# Patient Record
Sex: FEMALE | Race: NATIVE HAWAIIAN OR OTHER PACIFIC ISLANDER | NOT HISPANIC OR LATINO | URBAN - METROPOLITAN AREA
[De-identification: names, ages, dates, MRNs, and addresses within clinical notes are randomized per-mention and may not be internally consistent; named-entity substitution may affect disease eponyms.]

---

## 2022-02-03 ENCOUNTER — WEB ENCOUNTER (OUTPATIENT)
Dept: URBAN - METROPOLITAN AREA CLINIC 105 | Facility: CLINIC | Age: 67
End: 2022-02-03

## 2022-02-03 ENCOUNTER — OFFICE VISIT (OUTPATIENT)
Dept: URBAN - METROPOLITAN AREA CLINIC 105 | Facility: CLINIC | Age: 67
End: 2022-02-03
Payer: COMMERCIAL

## 2022-02-03 VITALS
HEART RATE: 92 BPM | HEIGHT: 64 IN | DIASTOLIC BLOOD PRESSURE: 77 MMHG | BODY MASS INDEX: 17.58 KG/M2 | WEIGHT: 103 LBS | SYSTOLIC BLOOD PRESSURE: 160 MMHG | TEMPERATURE: 97.3 F

## 2022-02-03 DIAGNOSIS — R10.10 PAIN OF UPPER ABDOMEN: ICD-10-CM

## 2022-02-03 DIAGNOSIS — R63.4 UNINTENDED WEIGHT LOSS: ICD-10-CM

## 2022-02-03 DIAGNOSIS — J44.9 CHRONIC OBSTRUCTIVE PULMONARY DISEASE, UNSPECIFIED COPD TYPE: ICD-10-CM

## 2022-02-03 DIAGNOSIS — I10 HTN (HYPERTENSION), BENIGN: ICD-10-CM

## 2022-02-03 DIAGNOSIS — K59.03 DRUG INDUCED CONSTIPATION: ICD-10-CM

## 2022-02-03 DIAGNOSIS — F11.90 CHRONIC NARCOTIC USE: ICD-10-CM

## 2022-02-03 DIAGNOSIS — G89.4 CHRONIC PAIN SYNDROME: ICD-10-CM

## 2022-02-03 DIAGNOSIS — Z72.0 TOBACCO ABUSE: ICD-10-CM

## 2022-02-03 DIAGNOSIS — R63.0 LOSS OF APPETITE: ICD-10-CM

## 2022-02-03 DIAGNOSIS — K59.09 CHRONIC CONSTIPATION: ICD-10-CM

## 2022-02-03 PROBLEM — 79890006: Status: ACTIVE | Noted: 2022-02-03

## 2022-02-03 PROCEDURE — 99205 OFFICE O/P NEW HI 60 MIN: CPT | Performed by: INTERNAL MEDICINE

## 2022-02-03 RX ORDER — POTASSIUM CHLORIDE 750 MG/1
TABLET, EXTENDED RELEASE ORAL
Qty: 45 | Status: ACTIVE | COMMUNITY

## 2022-02-03 RX ORDER — BUTALBITAL, ASPIRIN, AND CAFFEINE 50; 325; 40 MG/1; MG/1; MG/1
TAKE 1 CAPSULE BY MOUTH EVERY 6 HOURS AS NEEDED FOR HEADACHES CAPSULE ORAL
Qty: 30 | Refills: 0 | Status: ACTIVE | COMMUNITY

## 2022-02-03 RX ORDER — LISINOPRIL 40 MG/1
TAKE 1 TABLET BY MOUTH EVERY DAY TABLET ORAL
Qty: 90 | Refills: 0 | Status: ACTIVE | COMMUNITY

## 2022-02-03 RX ORDER — ALBUTEROL SULFATE 90 UG/1
INHALE 2 PUFFS INTO THE LUNGS EVERY 4 (FOUR) HOURS AS NEEDED FOR WHEEZING OR SHORTNESS OF BREATH AEROSOL, METERED RESPIRATORY (INHALATION)
Qty: 54 | Refills: 0 | Status: ACTIVE | COMMUNITY

## 2022-02-03 RX ORDER — UMECLIDINIUM BROMIDE AND VILANTEROL TRIFENATATE 62.5; 25 UG/1; UG/1
POWDER RESPIRATORY (INHALATION)
Qty: 60 | Status: ACTIVE | COMMUNITY

## 2022-02-03 RX ORDER — SODIUM, POTASSIUM,MAG SULFATES 17.5-3.13G
177 ML SOLUTION, RECONSTITUTED, ORAL ORAL
Qty: 1 KIT | Refills: 0 | OUTPATIENT
Start: 2022-02-03

## 2022-02-03 RX ORDER — BISACODYL 5 MG
TAKE 4 TABLET, DELAYED RELEASE (ENTERIC COATED) ORAL
Qty: 4 | OUTPATIENT
Start: 2022-02-03 | End: 2022-02-04

## 2022-02-03 RX ORDER — TRAZODONE HYDROCHLORIDE 50 MG/1
TAKE 1 TO 2 TABLETS BY MOUTH AT BEDTIME AS NEEDED TABLET ORAL
Qty: 180 | Refills: 0 | Status: ACTIVE | COMMUNITY

## 2022-02-03 RX ORDER — TIZANIDINE 4 MG/1
TAKE 1 TABLET BY MOUTH 1-2 TIMES DAILY AS NEEDED TABLET ORAL
Qty: 45 | Refills: 0 | Status: ACTIVE | COMMUNITY

## 2022-02-03 RX ORDER — NALOXEGOL OXALATE 25 MG/1
1 TABLET IN THE MORNING TABLET, FILM COATED ORAL ONCE A DAY
Qty: 90 | Refills: 3 | OUTPATIENT
Start: 2022-02-03 | End: 2022-06-03

## 2022-02-03 RX ORDER — OXYCODONE AND ACETAMINOPHEN 10; 325 MG/1; MG/1
TABLET ORAL
Qty: 120 | Status: ACTIVE | COMMUNITY

## 2022-02-03 RX ORDER — TRIAMCINOLONE ACETONIDE 1 MG/G
APPLY TO AFFECTED AREA TWICE A DAY CREAM TOPICAL
Qty: 30 | Refills: 0 | Status: ACTIVE | COMMUNITY

## 2022-02-03 RX ORDER — VENLAFAXINE HYDROCHLORIDE 150 MG/1
CAPSULE, EXTENDED RELEASE ORAL
Qty: 180 | Status: ACTIVE | COMMUNITY

## 2022-02-03 RX ORDER — IBUPROFEN 800 MG/1
TABLET, FILM COATED ORAL
Qty: 42 | Refills: 0 | Status: ACTIVE | COMMUNITY

## 2022-02-03 RX ORDER — GABAPENTIN 600 MG/1
TAKE 1 TABLET BY MOUTH THREE TIMES A DAY FOR 30 DAYS TABLET, FILM COATED ORAL
Qty: 90 | Refills: 0 | Status: ACTIVE | COMMUNITY

## 2022-02-03 NOTE — HPI-TODAY'S VISIT:
Patient comes to arrange a colonoscopy.  In April of 2012 she underwent colonoscopy with Dr. Acevedo. there was 1 small tubular adenoma removed from the sigmoid colon. - she reports having intermittent bouts of constipation down through the years. here late lately it has gotten worse. she can skip up to 6-7 days with no stool. no blood in the stool. has tried OTC stool softener, exlax, and MagCitrate. - she is taking 10mg of percocet QID. -   She also reports some loss of appetite and she endorses a 10 lb weight loss over the last 4-5 months.

## 2022-02-05 ENCOUNTER — OFFICE VISIT (OUTPATIENT)
Dept: URBAN - METROPOLITAN AREA MEDICAL CENTER 33 | Facility: MEDICAL CENTER | Age: 67
End: 2022-02-05
Payer: COMMERCIAL

## 2022-02-05 DIAGNOSIS — R63.4 ABNORMAL INTENTIONAL WEIGHT LOSS: ICD-10-CM

## 2022-02-05 DIAGNOSIS — K31.89 ACQUIRED DEFORMITY OF DUODENUM: ICD-10-CM

## 2022-02-05 DIAGNOSIS — D12.2 ADENOMA OF ASCENDING COLON: ICD-10-CM

## 2022-02-05 DIAGNOSIS — R19.4 ALTERATION IN BOWEL ELIMINATION: ICD-10-CM

## 2022-02-05 DIAGNOSIS — D12.4 ADENOMA OF DESCENDING COLON: ICD-10-CM

## 2022-02-05 DIAGNOSIS — R10.13 ABDOMINAL DISCOMFORT, EPIGASTRIC: ICD-10-CM

## 2022-02-05 DIAGNOSIS — K22.89 ESOPHAGEAL BLEEDING: ICD-10-CM

## 2022-02-05 PROCEDURE — 45385 COLONOSCOPY W/LESION REMOVAL: CPT | Performed by: INTERNAL MEDICINE

## 2022-02-05 PROCEDURE — 44361 SMALL BOWEL ENDOSCOPY/BIOPSY: CPT | Performed by: INTERNAL MEDICINE

## 2022-02-05 RX ORDER — GABAPENTIN 600 MG/1
TAKE 1 TABLET BY MOUTH THREE TIMES A DAY FOR 30 DAYS TABLET, FILM COATED ORAL
Qty: 90 | Refills: 0 | Status: ACTIVE | COMMUNITY

## 2022-02-05 RX ORDER — SODIUM, POTASSIUM,MAG SULFATES 17.5-3.13G
177 ML SOLUTION, RECONSTITUTED, ORAL ORAL
Qty: 1 KIT | Refills: 0 | Status: ACTIVE | COMMUNITY
Start: 2022-02-03

## 2022-02-05 RX ORDER — POTASSIUM CHLORIDE 750 MG/1
TABLET, EXTENDED RELEASE ORAL
Qty: 45 | Status: ACTIVE | COMMUNITY

## 2022-02-05 RX ORDER — LISINOPRIL 40 MG/1
TAKE 1 TABLET BY MOUTH EVERY DAY TABLET ORAL
Qty: 90 | Refills: 0 | Status: ACTIVE | COMMUNITY

## 2022-02-05 RX ORDER — ALBUTEROL SULFATE 90 UG/1
INHALE 2 PUFFS INTO THE LUNGS EVERY 4 (FOUR) HOURS AS NEEDED FOR WHEEZING OR SHORTNESS OF BREATH AEROSOL, METERED RESPIRATORY (INHALATION)
Qty: 54 | Refills: 0 | Status: ACTIVE | COMMUNITY

## 2022-02-05 RX ORDER — VENLAFAXINE HYDROCHLORIDE 150 MG/1
CAPSULE, EXTENDED RELEASE ORAL
Qty: 180 | Status: ACTIVE | COMMUNITY

## 2022-02-05 RX ORDER — OXYCODONE AND ACETAMINOPHEN 10; 325 MG/1; MG/1
TABLET ORAL
Qty: 120 | Status: ACTIVE | COMMUNITY

## 2022-02-05 RX ORDER — TIZANIDINE 4 MG/1
TAKE 1 TABLET BY MOUTH 1-2 TIMES DAILY AS NEEDED TABLET ORAL
Qty: 45 | Refills: 0 | Status: ACTIVE | COMMUNITY

## 2022-02-05 RX ORDER — TRAZODONE HYDROCHLORIDE 50 MG/1
TAKE 1 TO 2 TABLETS BY MOUTH AT BEDTIME AS NEEDED TABLET ORAL
Qty: 180 | Refills: 0 | Status: ACTIVE | COMMUNITY

## 2022-02-05 RX ORDER — NALOXEGOL OXALATE 25 MG/1
1 TABLET IN THE MORNING TABLET, FILM COATED ORAL ONCE A DAY
Qty: 90 | Refills: 3 | Status: ACTIVE | COMMUNITY
Start: 2022-02-03 | End: 2022-06-03

## 2022-02-05 RX ORDER — UMECLIDINIUM BROMIDE AND VILANTEROL TRIFENATATE 62.5; 25 UG/1; UG/1
POWDER RESPIRATORY (INHALATION)
Qty: 60 | Status: ACTIVE | COMMUNITY

## 2022-02-05 RX ORDER — FAMOTIDINE 40 MG/1
1 TABLET TABLET, FILM COATED ORAL ONCE A DAY
Qty: 90 | Refills: 3 | OUTPATIENT
Start: 2022-02-05

## 2022-02-05 RX ORDER — IBUPROFEN 800 MG/1
TABLET, FILM COATED ORAL
Qty: 42 | Refills: 0 | Status: ACTIVE | COMMUNITY

## 2022-02-05 RX ORDER — BUTALBITAL, ASPIRIN, AND CAFFEINE 50; 325; 40 MG/1; MG/1; MG/1
TAKE 1 CAPSULE BY MOUTH EVERY 6 HOURS AS NEEDED FOR HEADACHES CAPSULE ORAL
Qty: 30 | Refills: 0 | Status: ACTIVE | COMMUNITY

## 2022-02-05 RX ORDER — TRIAMCINOLONE ACETONIDE 1 MG/G
APPLY TO AFFECTED AREA TWICE A DAY CREAM TOPICAL
Qty: 30 | Refills: 0 | Status: ACTIVE | COMMUNITY

## 2022-02-08 ENCOUNTER — TELEPHONE ENCOUNTER (OUTPATIENT)
Dept: URBAN - METROPOLITAN AREA CLINIC 105 | Facility: CLINIC | Age: 67
End: 2022-02-08

## 2022-03-07 ENCOUNTER — OFFICE VISIT (OUTPATIENT)
Dept: URBAN - METROPOLITAN AREA CLINIC 105 | Facility: CLINIC | Age: 67
End: 2022-03-07
Payer: COMMERCIAL

## 2022-03-07 ENCOUNTER — OFFICE VISIT (OUTPATIENT)
Dept: URBAN - METROPOLITAN AREA CLINIC 105 | Facility: CLINIC | Age: 67
End: 2022-03-07

## 2022-03-07 VITALS
DIASTOLIC BLOOD PRESSURE: 67 MMHG | HEART RATE: 97 BPM | HEIGHT: 64 IN | SYSTOLIC BLOOD PRESSURE: 124 MMHG | TEMPERATURE: 98.1 F | BODY MASS INDEX: 17.28 KG/M2 | WEIGHT: 101.2 LBS

## 2022-03-07 DIAGNOSIS — K59.09 CHRONIC CONSTIPATION: ICD-10-CM

## 2022-03-07 DIAGNOSIS — Z99.81 ON HOME OXYGEN THERAPY: ICD-10-CM

## 2022-03-07 DIAGNOSIS — J44.9 CHRONIC OBSTRUCTIVE PULMONARY DISEASE, UNSPECIFIED COPD TYPE: ICD-10-CM

## 2022-03-07 DIAGNOSIS — F11.90 CHRONIC NARCOTIC USE: ICD-10-CM

## 2022-03-07 DIAGNOSIS — Z86.010 HISTORY OF COLON POLYPS: ICD-10-CM

## 2022-03-07 DIAGNOSIS — Z72.0 TOBACCO ABUSE: ICD-10-CM

## 2022-03-07 PROCEDURE — 99214 OFFICE O/P EST MOD 30 MIN: CPT | Performed by: INTERNAL MEDICINE

## 2022-03-07 RX ORDER — SODIUM, POTASSIUM,MAG SULFATES 17.5-3.13G
177 ML SOLUTION, RECONSTITUTED, ORAL ORAL
Qty: 1 KIT | Refills: 0 | Status: ON HOLD | COMMUNITY
Start: 2022-02-03

## 2022-03-07 RX ORDER — IBUPROFEN 800 MG/1
TABLET, FILM COATED ORAL
Qty: 42 | Refills: 0 | Status: ACTIVE | COMMUNITY

## 2022-03-07 RX ORDER — SODIUM, POTASSIUM,MAG SULFATES 17.5-3.13G
177 ML SOLUTION, RECONSTITUTED, ORAL ORAL
Qty: 1 KIT | Refills: 0 | COMMUNITY
Start: 2022-02-03

## 2022-03-07 RX ORDER — TIZANIDINE 4 MG/1
TAKE 1 TABLET BY MOUTH 1-2 TIMES DAILY AS NEEDED TABLET ORAL
Qty: 45 | Refills: 0 | Status: ACTIVE | COMMUNITY

## 2022-03-07 RX ORDER — TRIAMCINOLONE ACETONIDE 1 MG/G
APPLY TO AFFECTED AREA TWICE A DAY CREAM TOPICAL
Qty: 30 | Refills: 0 | COMMUNITY

## 2022-03-07 RX ORDER — NALOXEGOL OXALATE 25 MG/1
1 TABLET IN THE MORNING TABLET, FILM COATED ORAL ONCE A DAY
Qty: 90 | Refills: 3 | Status: ACTIVE | COMMUNITY
Start: 2022-02-03 | End: 2022-06-03

## 2022-03-07 RX ORDER — IBUPROFEN 800 MG/1
TABLET, FILM COATED ORAL
Qty: 42 | Refills: 0 | COMMUNITY

## 2022-03-07 RX ORDER — POTASSIUM CHLORIDE 750 MG/1
TABLET, EXTENDED RELEASE ORAL
Qty: 45 | Status: ACTIVE | COMMUNITY

## 2022-03-07 RX ORDER — ALBUTEROL SULFATE 90 UG/1
INHALE 2 PUFFS INTO THE LUNGS EVERY 4 (FOUR) HOURS AS NEEDED FOR WHEEZING OR SHORTNESS OF BREATH AEROSOL, METERED RESPIRATORY (INHALATION)
Qty: 54 | Refills: 0 | Status: ACTIVE | COMMUNITY

## 2022-03-07 RX ORDER — NALOXEGOL OXALATE 25 MG/1
1 TABLET IN THE MORNING TABLET, FILM COATED ORAL ONCE A DAY
Qty: 90 | Refills: 3 | COMMUNITY
Start: 2022-02-03 | End: 2022-06-03

## 2022-03-07 RX ORDER — TIZANIDINE 4 MG/1
TAKE 1 TABLET BY MOUTH 1-2 TIMES DAILY AS NEEDED TABLET ORAL
Qty: 45 | Refills: 0 | COMMUNITY

## 2022-03-07 RX ORDER — VENLAFAXINE HYDROCHLORIDE 150 MG/1
CAPSULE, EXTENDED RELEASE ORAL
Qty: 180 | COMMUNITY

## 2022-03-07 RX ORDER — VENLAFAXINE HYDROCHLORIDE 150 MG/1
CAPSULE, EXTENDED RELEASE ORAL
Qty: 180 | Status: ACTIVE | COMMUNITY

## 2022-03-07 RX ORDER — UMECLIDINIUM BROMIDE AND VILANTEROL TRIFENATATE 62.5; 25 UG/1; UG/1
POWDER RESPIRATORY (INHALATION)
Qty: 60 | COMMUNITY

## 2022-03-07 RX ORDER — TRIAMCINOLONE ACETONIDE 1 MG/G
APPLY TO AFFECTED AREA TWICE A DAY CREAM TOPICAL
Qty: 30 | Refills: 0 | Status: ACTIVE | COMMUNITY

## 2022-03-07 RX ORDER — TRAZODONE HYDROCHLORIDE 50 MG/1
TAKE 1 TO 2 TABLETS BY MOUTH AT BEDTIME AS NEEDED TABLET ORAL
Qty: 180 | Refills: 0 | COMMUNITY

## 2022-03-07 RX ORDER — BUTALBITAL, ASPIRIN, AND CAFFEINE 50; 325; 40 MG/1; MG/1; MG/1
TAKE 1 CAPSULE BY MOUTH EVERY 6 HOURS AS NEEDED FOR HEADACHES CAPSULE ORAL
Qty: 30 | Refills: 0 | Status: ACTIVE | COMMUNITY

## 2022-03-07 RX ORDER — GABAPENTIN 600 MG/1
TAKE 1 TABLET BY MOUTH THREE TIMES A DAY FOR 30 DAYS TABLET, FILM COATED ORAL
Qty: 90 | Refills: 0 | Status: ACTIVE | COMMUNITY

## 2022-03-07 RX ORDER — LISINOPRIL 40 MG/1
TAKE 1 TABLET BY MOUTH EVERY DAY TABLET ORAL
Qty: 90 | Refills: 0 | Status: ACTIVE | COMMUNITY

## 2022-03-07 RX ORDER — ALBUTEROL SULFATE 90 UG/1
INHALE 2 PUFFS INTO THE LUNGS EVERY 4 (FOUR) HOURS AS NEEDED FOR WHEEZING OR SHORTNESS OF BREATH AEROSOL, METERED RESPIRATORY (INHALATION)
Qty: 54 | Refills: 0 | COMMUNITY

## 2022-03-07 RX ORDER — UMECLIDINIUM BROMIDE AND VILANTEROL TRIFENATATE 62.5; 25 UG/1; UG/1
POWDER RESPIRATORY (INHALATION)
Qty: 60 | Status: ACTIVE | COMMUNITY

## 2022-03-07 RX ORDER — LISINOPRIL 40 MG/1
TAKE 1 TABLET BY MOUTH EVERY DAY TABLET ORAL
Qty: 90 | Refills: 0 | COMMUNITY

## 2022-03-07 RX ORDER — GABAPENTIN 600 MG/1
TAKE 1 TABLET BY MOUTH THREE TIMES A DAY FOR 30 DAYS TABLET, FILM COATED ORAL
Qty: 90 | Refills: 0 | COMMUNITY

## 2022-03-07 RX ORDER — FAMOTIDINE 40 MG/1
1 TABLET TABLET, FILM COATED ORAL ONCE A DAY
Qty: 90 | Refills: 3 | Status: ACTIVE | COMMUNITY
Start: 2022-02-05

## 2022-03-07 RX ORDER — TRAZODONE HYDROCHLORIDE 50 MG/1
TAKE 1 TO 2 TABLETS BY MOUTH AT BEDTIME AS NEEDED TABLET ORAL
Qty: 180 | Refills: 0 | Status: ACTIVE | COMMUNITY

## 2022-03-07 RX ORDER — BUTALBITAL, ASPIRIN, AND CAFFEINE 50; 325; 40 MG/1; MG/1; MG/1
TAKE 1 CAPSULE BY MOUTH EVERY 6 HOURS AS NEEDED FOR HEADACHES CAPSULE ORAL
Qty: 30 | Refills: 0 | COMMUNITY

## 2022-03-07 RX ORDER — OXYCODONE AND ACETAMINOPHEN 10; 325 MG/1; MG/1
TABLET ORAL
Qty: 120 | COMMUNITY

## 2022-03-07 RX ORDER — OXYCODONE AND ACETAMINOPHEN 10; 325 MG/1; MG/1
TABLET ORAL
Qty: 120 | Status: ACTIVE | COMMUNITY

## 2022-03-07 RX ORDER — POTASSIUM CHLORIDE 750 MG/1
TABLET, EXTENDED RELEASE ORAL
Qty: 45 | COMMUNITY

## 2022-03-07 RX ORDER — FAMOTIDINE 40 MG/1
1 TABLET TABLET, FILM COATED ORAL ONCE A DAY
Qty: 90 | Refills: 3 | COMMUNITY
Start: 2022-02-05

## 2022-03-07 NOTE — HPI-TODAY'S VISIT:
Patient comes to arrange a colonoscopy.  In April of 2012 she underwent colonoscopy with Dr. Acevedo. there was 1 small tubular adenoma removed from the sigmoid colon. - she reports having intermittent bouts of constipation down through the years. here late lately it has gotten worse. she can skip up to 6-7 days with no stool. no blood in the stool. has tried OTC stool softener, exlax, and MagCitrate. - she is taking 10mg of percocet QID. -   She also reports some loss of appetite and she endorses a 10 lb weight loss over the last 4-5 months. -  03/07/2022:  February of 2022 we performed bidirectional endoscopy.  Her small bowel enteroscopy was significant for loss Lolly grade a reflux esophagitis from which biopsies were negative for Rivera's metaplasia.  There was erythema in the antrum.  No Helicobacter on biopsies.  Colonoscopy revealed several adenomatous colon polyps.  She returns to the office today here at [facility] for follow-up. - she is having about one stool per day. I started her on Movantik daily. it has helped over all. she is not skipping 3-4 days now without a stool.

## 2022-03-07 NOTE — HPI-TODAY'S VISIT:
Patient comes to arrange a colonoscopy.  In April of 2012 she underwent colonoscopy with Dr. Acevedo. there was 1 small tubular adenoma removed from the sigmoid colon. - she reports having intermittent bouts of constipation down through the years. here late lately it has gotten worse. she can skip up to 6-7 days with no stool. no blood in the stool. has tried OTC stool softener, exlax, and MagCitrate. - she is taking 10mg of percocet QID. -   She also reports some loss of appetite and she endorses a 10 lb weight loss over the last 4-5 months. -  03/07/2022:  February of 2022 we performed bidirectional endoscopy.  Her small bowel enteroscopy was significant for loss Lolly grade a reflux esophagitis from which biopsies were negative for Rivera's metaplasia.  There was erythema in the antrum.  No Helicobacter on biopsies.  Colonoscopy revealed several adenomatous colon polyps.  She returns to the office today here at [facility] for follow-up.

## 2022-09-13 ENCOUNTER — OFFICE VISIT (OUTPATIENT)
Dept: URBAN - METROPOLITAN AREA CLINIC 105 | Facility: CLINIC | Age: 67
End: 2022-09-13
Payer: COMMERCIAL

## 2022-09-13 VITALS
WEIGHT: 103 LBS | DIASTOLIC BLOOD PRESSURE: 85 MMHG | HEIGHT: 64 IN | SYSTOLIC BLOOD PRESSURE: 173 MMHG | BODY MASS INDEX: 17.58 KG/M2 | TEMPERATURE: 97.3 F | HEART RATE: 91 BPM

## 2022-09-13 DIAGNOSIS — J44.9 CHRONIC OBSTRUCTIVE PULMONARY DISEASE, UNSPECIFIED COPD TYPE: ICD-10-CM

## 2022-09-13 DIAGNOSIS — Z87.891 FORMER SMOKER: ICD-10-CM

## 2022-09-13 DIAGNOSIS — F11.90 CHRONIC NARCOTIC USE: ICD-10-CM

## 2022-09-13 DIAGNOSIS — I10 HTN (HYPERTENSION), BENIGN: ICD-10-CM

## 2022-09-13 DIAGNOSIS — K59.03 DRUG-INDUCED CONSTIPATION: ICD-10-CM

## 2022-09-13 DIAGNOSIS — Z99.81 ON HOME OXYGEN THERAPY: ICD-10-CM

## 2022-09-13 DIAGNOSIS — Z86.010 HISTORY OF COLON POLYPS: ICD-10-CM

## 2022-09-13 DIAGNOSIS — G89.4 CHRONIC PAIN SYNDROME: ICD-10-CM

## 2022-09-13 DIAGNOSIS — K21.00 GASTROESOPHAGEAL REFLUX DISEASE WITH ESOPHAGITIS WITHOUT HEMORRHAGE: ICD-10-CM

## 2022-09-13 PROBLEM — 266433003: Status: ACTIVE | Noted: 2022-09-13

## 2022-09-13 PROBLEM — 10725009: Status: ACTIVE | Noted: 2022-02-03

## 2022-09-13 PROBLEM — 13645005: Status: ACTIVE | Noted: 2022-02-03

## 2022-09-13 PROBLEM — 931000119107: Status: ACTIVE | Noted: 2022-03-07

## 2022-09-13 PROBLEM — 373621006: Status: ACTIVE | Noted: 2022-02-03

## 2022-09-13 PROBLEM — 428283002: Status: ACTIVE | Noted: 2022-03-07

## 2022-09-13 PROCEDURE — 99214 OFFICE O/P EST MOD 30 MIN: CPT | Performed by: INTERNAL MEDICINE

## 2022-09-13 RX ORDER — FAMOTIDINE 40 MG/1
1 TABLET TABLET, FILM COATED ORAL ONCE A DAY
Qty: 90 | Refills: 3 | Status: ACTIVE | COMMUNITY
Start: 2022-02-05

## 2022-09-13 RX ORDER — MIRTAZAPINE 15 MG/1
1 TABLET AT BEDTIME TABLET, FILM COATED ORAL ONCE A DAY
Status: ACTIVE | COMMUNITY

## 2022-09-13 RX ORDER — ALBUTEROL SULFATE 90 UG/1
INHALE 2 PUFFS INTO THE LUNGS EVERY 4 (FOUR) HOURS AS NEEDED FOR WHEEZING OR SHORTNESS OF BREATH AEROSOL, METERED RESPIRATORY (INHALATION)
Qty: 54 | Refills: 0 | Status: ACTIVE | COMMUNITY

## 2022-09-13 RX ORDER — TRAZODONE HYDROCHLORIDE 50 MG/1
TAKE 1 TO 2 TABLETS BY MOUTH AT BEDTIME AS NEEDED TABLET ORAL
Qty: 180 | Refills: 0 | Status: ON HOLD | COMMUNITY

## 2022-09-13 RX ORDER — LISINOPRIL 40 MG/1
TAKE 1 TABLET BY MOUTH EVERY DAY TABLET ORAL
Qty: 90 | Refills: 0 | Status: ACTIVE | COMMUNITY

## 2022-09-13 RX ORDER — POTASSIUM CHLORIDE 750 MG/1
TABLET, EXTENDED RELEASE ORAL
Qty: 45 | Status: ACTIVE | COMMUNITY

## 2022-09-13 RX ORDER — VENLAFAXINE HYDROCHLORIDE 150 MG/1
CAPSULE, EXTENDED RELEASE ORAL
Qty: 180 | Status: ACTIVE | COMMUNITY

## 2022-09-13 RX ORDER — IBUPROFEN 800 MG/1
TABLET, FILM COATED ORAL
Qty: 42 | Refills: 0 | Status: ACTIVE | COMMUNITY

## 2022-09-13 RX ORDER — OXYCODONE AND ACETAMINOPHEN 10; 325 MG/1; MG/1
TABLET ORAL
Qty: 120 | Status: ACTIVE | COMMUNITY

## 2022-09-13 RX ORDER — TIZANIDINE 4 MG/1
TAKE 1 TABLET BY MOUTH 1-2 TIMES DAILY AS NEEDED TABLET ORAL
Qty: 45 | Refills: 0 | Status: ACTIVE | COMMUNITY

## 2022-09-13 RX ORDER — UMECLIDINIUM BROMIDE AND VILANTEROL TRIFENATATE 62.5; 25 UG/1; UG/1
POWDER RESPIRATORY (INHALATION)
Qty: 60 | Status: ACTIVE | COMMUNITY

## 2022-09-13 RX ORDER — SODIUM, POTASSIUM,MAG SULFATES 17.5-3.13G
177 ML SOLUTION, RECONSTITUTED, ORAL ORAL
Qty: 1 KIT | Refills: 0 | Status: ON HOLD | COMMUNITY
Start: 2022-02-03

## 2022-09-13 RX ORDER — BUTALBITAL, ASPIRIN, AND CAFFEINE 50; 325; 40 MG/1; MG/1; MG/1
TAKE 1 CAPSULE BY MOUTH EVERY 6 HOURS AS NEEDED FOR HEADACHES CAPSULE ORAL
Qty: 30 | Refills: 0 | Status: ACTIVE | COMMUNITY

## 2022-09-13 RX ORDER — GABAPENTIN 600 MG/1
TAKE 1 TABLET BY MOUTH THREE TIMES A DAY FOR 30 DAYS TABLET, FILM COATED ORAL
Qty: 90 | Refills: 0 | Status: ACTIVE | COMMUNITY

## 2022-09-13 RX ORDER — TRIAMCINOLONE ACETONIDE 1 MG/G
APPLY TO AFFECTED AREA TWICE A DAY CREAM TOPICAL
Qty: 30 | Refills: 0 | Status: ACTIVE | COMMUNITY

## 2022-09-13 NOTE — HPI-TODAY'S VISIT:
Patient comes to arrange a colonoscopy.  In April of 2012 she underwent colonoscopy with Dr. Acevedo. there was 1 small tubular adenoma removed from the sigmoid colon. - she reports having intermittent bouts of constipation down through the years. here late lately it has gotten worse. she can skip up to 6-7 days with no stool. no blood in the stool. has tried OTC stool softener, exlax, and MagCitrate. - she is taking 10mg of percocet QID. -   She also reports some loss of appetite and she endorses a 10 lb weight loss over the last 4-5 months. -  03/07/2022:  February of 2022 we performed bidirectional endoscopy.  Her small bowel enteroscopy was significant for loss Lolly grade a reflux esophagitis from which biopsies were negative for Rivera's metaplasia.  There was erythema in the antrum.  No Helicobacter on biopsies.  Colonoscopy revealed several adenomatous colon polyps.  She returns to the office today here at [facility] for follow-up. - she is having about one stool per day. I started her on Movantik daily. it has helped over all. she is not skipping 3-4 days now without a stool. -  09/13/2022: I last saw her back in March of 2022 for follow-up after we did EGD and colonoscopy.  She is doing quite well at the time after I put her on Movantik for her opiate induced constipation.  - pt reports last night, she had pizza. not eating well. she is having a stool about every 3-4 days. still taking narcotics for scoliosis and chronic back pain.

## 2023-03-15 ENCOUNTER — LAB OUTSIDE AN ENCOUNTER (OUTPATIENT)
Dept: URBAN - METROPOLITAN AREA CLINIC 105 | Facility: CLINIC | Age: 68
End: 2023-03-15

## 2023-03-15 ENCOUNTER — DASHBOARD ENCOUNTERS (OUTPATIENT)
Age: 68
End: 2023-03-15

## 2023-03-15 ENCOUNTER — OFFICE VISIT (OUTPATIENT)
Dept: URBAN - METROPOLITAN AREA CLINIC 105 | Facility: CLINIC | Age: 68
End: 2023-03-15
Payer: COMMERCIAL

## 2023-03-15 VITALS
HEIGHT: 64 IN | DIASTOLIC BLOOD PRESSURE: 81 MMHG | SYSTOLIC BLOOD PRESSURE: 171 MMHG | BODY MASS INDEX: 17.93 KG/M2 | WEIGHT: 105 LBS | TEMPERATURE: 97.7 F | HEART RATE: 96 BPM

## 2023-03-15 DIAGNOSIS — F11.90 CHRONIC NARCOTIC USE: ICD-10-CM

## 2023-03-15 DIAGNOSIS — M41.9 SCOLIOSIS OF LUMBOSACRAL SPINE, UNSPECIFIED SCOLIOSIS TYPE: ICD-10-CM

## 2023-03-15 DIAGNOSIS — G89.29 OTHER CHRONIC PAIN: ICD-10-CM

## 2023-03-15 DIAGNOSIS — Z86.010 HISTORY OF COLONIC POLYPS: ICD-10-CM

## 2023-03-15 DIAGNOSIS — K59.03 DRUG INDUCED CONSTIPATION: ICD-10-CM

## 2023-03-15 DIAGNOSIS — K59.09 CHRONIC CONSTIPATION: ICD-10-CM

## 2023-03-15 DIAGNOSIS — Z99.81 ON HOME O2: ICD-10-CM

## 2023-03-15 DIAGNOSIS — M54.9 DORSALGIA, UNSPECIFIED: ICD-10-CM

## 2023-03-15 DIAGNOSIS — J44.9 CHRONIC OBSTRUCTIVE PULMONARY DISEASE, UNSPECIFIED COPD TYPE: ICD-10-CM

## 2023-03-15 PROBLEM — 82423001: Status: ACTIVE | Noted: 2023-03-15

## 2023-03-15 PROBLEM — 931000119107: Status: ACTIVE | Noted: 2023-03-15

## 2023-03-15 PROBLEM — 298382003: Status: ACTIVE | Noted: 2023-03-15

## 2023-03-15 PROBLEM — 428283002: Status: ACTIVE | Noted: 2023-03-15

## 2023-03-15 PROCEDURE — 99214 OFFICE O/P EST MOD 30 MIN: CPT | Performed by: INTERNAL MEDICINE

## 2023-03-15 RX ORDER — LISINOPRIL 40 MG/1
TAKE 1 TABLET BY MOUTH EVERY DAY TABLET ORAL
Qty: 90 | Refills: 0 | Status: ACTIVE | COMMUNITY

## 2023-03-15 RX ORDER — AMLODIPINE BESYLATE 5 MG/1
TAKE 1 TABLET (5 MG TOTAL) BY MOUTH DAILY TABLET ORAL
Qty: 90 | Refills: 0 | Status: ACTIVE | COMMUNITY

## 2023-03-15 RX ORDER — POTASSIUM CHLORIDE 750 MG/1
TABLET, EXTENDED RELEASE ORAL
Qty: 45 | Status: ACTIVE | COMMUNITY

## 2023-03-15 RX ORDER — TRAZODONE HYDROCHLORIDE 50 MG/1
TAKE 1 TO 2 TABLETS BY MOUTH AT BEDTIME AS NEEDED TABLET ORAL
Qty: 180 | Refills: 0 | Status: ON HOLD | COMMUNITY

## 2023-03-15 RX ORDER — GABAPENTIN 600 MG/1
TAKE 1 TABLET BY MOUTH THREE TIMES A DAY FOR 30 DAYS TABLET, FILM COATED ORAL
Qty: 90 | Refills: 0 | Status: ACTIVE | COMMUNITY

## 2023-03-15 RX ORDER — IBUPROFEN 800 MG/1
TABLET, FILM COATED ORAL
Qty: 42 | Refills: 0 | Status: ACTIVE | COMMUNITY

## 2023-03-15 RX ORDER — OXYCODONE AND ACETAMINOPHEN 10; 325 MG/1; MG/1
TABLET ORAL
Qty: 120 | Status: ON HOLD | COMMUNITY

## 2023-03-15 RX ORDER — SODIUM, POTASSIUM,MAG SULFATES 17.5-3.13G
177 ML SOLUTION, RECONSTITUTED, ORAL ORAL
Qty: 1 KIT | Refills: 0 | Status: ON HOLD | COMMUNITY
Start: 2022-02-03

## 2023-03-15 RX ORDER — BUTALBITAL, ASPIRIN, AND CAFFEINE 50; 325; 40 MG/1; MG/1; MG/1
TAKE 1 CAPSULE BY MOUTH EVERY 6 HOURS AS NEEDED FOR HEADACHES CAPSULE ORAL
Qty: 30 | Refills: 0 | Status: ACTIVE | COMMUNITY

## 2023-03-15 RX ORDER — FAMOTIDINE 40 MG/1
1 TABLET TABLET, FILM COATED ORAL ONCE A DAY
Qty: 90 | Refills: 3 | Status: ACTIVE | COMMUNITY
Start: 2022-02-05

## 2023-03-15 RX ORDER — ALBUTEROL SULFATE 90 UG/1
INHALE 2 PUFFS INTO THE LUNGS EVERY 4 (FOUR) HOURS AS NEEDED FOR WHEEZING OR SHORTNESS OF BREATH AEROSOL, METERED RESPIRATORY (INHALATION)
Qty: 54 | Refills: 0 | Status: ACTIVE | COMMUNITY

## 2023-03-15 RX ORDER — TIZANIDINE 4 MG/1
TAKE 1 TABLET BY MOUTH 1-2 TIMES DAILY AS NEEDED TABLET ORAL
Qty: 45 | Refills: 0 | Status: ACTIVE | COMMUNITY

## 2023-03-15 RX ORDER — MIRTAZAPINE 15 MG/1
1 TABLET AT BEDTIME TABLET, FILM COATED ORAL ONCE A DAY
Status: ACTIVE | COMMUNITY

## 2023-03-15 RX ORDER — VENLAFAXINE HYDROCHLORIDE 150 MG/1
CAPSULE, EXTENDED RELEASE ORAL
Qty: 180 | Status: ACTIVE | COMMUNITY

## 2023-03-15 RX ORDER — NALOXEGOL OXALATE 25 MG/1
1 TABLET IN THE MORNING TABLET, FILM COATED ORAL ONCE A DAY
Qty: 90 TABLET | Refills: 3 | OUTPATIENT
Start: 2023-03-15 | End: 2024-03-08

## 2023-03-15 RX ORDER — UMECLIDINIUM BROMIDE AND VILANTEROL TRIFENATATE 62.5; 25 UG/1; UG/1
POWDER RESPIRATORY (INHALATION)
Qty: 60 | Status: ACTIVE | COMMUNITY

## 2023-03-15 RX ORDER — TRIAMCINOLONE ACETONIDE 1 MG/G
APPLY TO AFFECTED AREA TWICE A DAY CREAM TOPICAL
Qty: 30 | Refills: 0 | Status: ACTIVE | COMMUNITY

## 2023-03-15 NOTE — HPI-TODAY'S VISIT:
Patient comes to arrange a colonoscopy.  In April of 2012 she underwent colonoscopy with Dr. Acevedo. there was 1 small tubular adenoma removed from the sigmoid colon. - she reports having intermittent bouts of constipation down through the years. here late lately it has gotten worse. she can skip up to 6-7 days with no stool. no blood in the stool. has tried OTC stool softener, exlax, and MagCitrate. - she is taking 10mg of percocet QID. -   She also reports some loss of appetite and she endorses a 10 lb weight loss over the last 4-5 months. -  03/07/2022:  February of 2022 we performed bidirectional endoscopy.  Her small bowel enteroscopy was significant for loss Lolly grade a reflux esophagitis from which biopsies were negative for Rivera's metaplasia.  There was erythema in the antrum.  No Helicobacter on biopsies.  Colonoscopy revealed several adenomatous colon polyps.  She returns to the office today here at [facility] for follow-up. - she is having about one stool per day. I started her on Movantik daily. it has helped over all. she is not skipping 3-4 days now without a stool. -  09/13/2022: I last saw her back in March of 2022 for follow-up after we did EGD and colonoscopy.  She is doing quite well at the time after I put her on Movantik for her opiate induced constipation.  - pt reports last night, she had pizza. not eating well. she is having a stool about every 3-4 days. still taking narcotics for scoliosis and chronic back pain.

## 2023-03-27 ENCOUNTER — TELEPHONE ENCOUNTER (OUTPATIENT)
Dept: URBAN - METROPOLITAN AREA CLINIC 105 | Facility: CLINIC | Age: 68
End: 2023-03-27

## 2024-03-13 ENCOUNTER — OV EP (OUTPATIENT)
Dept: URBAN - METROPOLITAN AREA CLINIC 105 | Facility: CLINIC | Age: 69
End: 2024-03-13

## 2024-04-02 ENCOUNTER — OV EP (OUTPATIENT)
Dept: URBAN - METROPOLITAN AREA CLINIC 105 | Facility: CLINIC | Age: 69
End: 2024-04-02